# Patient Record
(demographics unavailable — no encounter records)

---

## 2017-05-24 NOTE — [facility]
[f 
rep st]



                                                       PREOP HISTORY AND 
PHYSICAL





DATE OF ADMISSION:  2017



DATE OF PLANNED PROCEDURE:  2017



PLANNED PROCEDURE:  Diagnostic laparoscopy, right salpingo-oophorectomy, 
possible right ovarian cystectomy.



INDICATIONS:  The patient is an 18-year-old  0, who presented for 
insertion of an IUD in 2016.  The Mirena IUD was inserted without 
difficulty.  She presented a month later for an IUD check, complaining of 
spotting, increased cramping, and pain after intercourse.  She followed up in 
2017 for pain, spotting, and primarily pain with intercourse.  A 
bedside ultrasound was performed by the midwife, but only IUD placement was 
checked, and that was found to be appropriate.  She saw me in 2017 with a 
complaint of persistent painful intercourse.  The patient stated that the 
bleeding had overall decreased and she had had 2 normal periods, but she was 
having pain with intercourse 40% of the time.  She states the pain was with 
deep penetration.  Speculum exam was unremarkable, but there was some right 
adnexal fullness noted.  I did a transvaginal ultrasound which showed the IUD 
in place and a 7 x 3 x 3.9 cm right adnexal mass.  She had a repeat ultrasound 
a month later which showed the uterus and IUD unremarkable, and the right ovary 
with a 6.6 x 6 x 7 cm simple area and a 4.9 x 1.9 x 1.6 cm solid area in her 
right ovary.  The left ovary was unremarkable.  We reviewed torsion precautions 
and surgical precautions, and the patient complained of persistent dyspareunia.
  We got a repeat ultrasound on 2017 which showed again an unremarkable 
uterus and IUD, and the right ovary had a 5.8 x 4.3 x 6.7 complex area and a 
2.8 x 3.2 x 3.59 cm solid area.  There was no flow visualized within the cystic 
area, and the solid area appeared to be slightly increased in size.  Tumor 
markers including Inhibin A, estradiol, CA-125, AFP, and CEA were all negative.
  Chlamydia and gonorrhea cultures were negative.  The patient is still 
continuing to have persistent discomfort with intercourse.  We had a long 
discussion about options including expected management versus surgical 
management, and the risk of torsion precautions.  We did discuss, even though 
her tumor markers were normal, the possibility of abnormal pathology and the 
ideal situation of not spilling any contents of the cyst wall into her pelvis.  
The patient is hopeful to avoid removing her ovary completely, so we discussed 
the possibility of ovarian cystectomy with the known risks of spilling abnormal 
contents into the pelvis versus performing an oophorectomy, draining the cyst 
in an EndoCatch bag, and then removing the contents of the solid ovary.  The 
risks and benefits have been extensively reviewed with the patient and her 
mother, and the patient has been properly consented.



MEDICAL HISTORY:  History of anxiety and depression, chronic back pain.



MEDICATIONS:  Mirena IUD.



SURGICAL HISTORY:  Steroid injection in her back, but no other procedures.



ALLERGIES:  Amoxicillin.



SOCIAL HISTORY:  The patient is in a relationship.  She denies tobacco or drug 
use.



FAMILY MEDICAL HISTORY:  Noncontributory.



OBSTETRICAL/GYNECOLOGIC HISTORY:  Menarche at age 11.  She does have monthly 
light spotting with the Mirena.  She has never had a Pap smear.  She denies any 
history of chlamydia or gonorrhea, or any other sexually transmitted diseases.



REVIEW OF SYSTEMS:  A 10-point review of systems is negative, with the 
pertinent positives mentioned above.



PHYSICAL EXAMINATION:  VITAL SIGNS:  Stable.  GENERAL APPEARANCE:  She is alert 
and oriented x3.  PSYCHIATRIC:  She has a normal affect and responses.  NECK:  
Mobile and supple, and there is no thyromegaly.  LUNGS:  Clear to auscultation 
bilaterally.  HEART:  Rate is regular.  ABDOMEN:  Soft, nondistended, 
nontender.  There is no organomegaly.  EXTREMITIES:  Reveal no calf tenderness 
or edema.  PELVIC:  Exam reveals a mobile, midposition uterus, with right 
adnexal fullness.



ASSESSMENT AND PLAN:  An 18-year-old  0, who presents for persistent 
dyspareunia and the incidental finding of an adnexal mass, who will undergo 
diagnostic laparoscopy and most likely a right salpingo-oophorectomy and 
possible right ovarian cystectomy.  The risks and benefits of the procedure 
were extensively reviewed with the patient and her mother, and the patient has 
been properly consented.





Job #:  [id number]

MTDD